# Patient Record
Sex: MALE | Race: WHITE | NOT HISPANIC OR LATINO | URBAN - METROPOLITAN AREA
[De-identification: names, ages, dates, MRNs, and addresses within clinical notes are randomized per-mention and may not be internally consistent; named-entity substitution may affect disease eponyms.]

---

## 2019-09-10 ENCOUNTER — OFFICE (OUTPATIENT)
Dept: URBAN - METROPOLITAN AREA CLINIC 33 | Facility: CLINIC | Age: 49
End: 2019-09-10

## 2019-09-10 VITALS
DIASTOLIC BLOOD PRESSURE: 64 MMHG | HEART RATE: 68 BPM | SYSTOLIC BLOOD PRESSURE: 107 MMHG | TEMPERATURE: 97.4 F | WEIGHT: 204 LBS | HEIGHT: 77 IN

## 2019-09-10 DIAGNOSIS — R10.12 LEFT UPPER QUADRANT PAIN: ICD-10-CM

## 2019-09-10 DIAGNOSIS — K59.00 CONSTIPATION, UNSPECIFIED: ICD-10-CM

## 2019-09-10 DIAGNOSIS — R19.09 OTHER INTRA-ABDOMINAL AND PELVIC SWELLING, MASS AND LUMP: ICD-10-CM

## 2019-09-10 DIAGNOSIS — R93.5 ABNORMAL FINDINGS ON DIAGNOSTIC IMAGING OF OTHER ABDOMINAL R: ICD-10-CM

## 2019-09-10 PROCEDURE — 99204 OFFICE O/P NEW MOD 45 MIN: CPT

## 2020-10-09 ENCOUNTER — OFFICE (OUTPATIENT)
Dept: URBAN - METROPOLITAN AREA CLINIC 34 | Facility: CLINIC | Age: 50
End: 2020-10-09
Payer: COMMERCIAL

## 2020-10-09 VITALS — TEMPERATURE: 98.2 F | HEIGHT: 77 IN | WEIGHT: 147 LBS

## 2020-10-09 DIAGNOSIS — B96.81 HELICOBACTER PYLORI [H. PYLORI] AS THE CAUSE OF DISEASES CLA: ICD-10-CM

## 2020-10-09 DIAGNOSIS — R11.10 VOMITING, UNSPECIFIED: ICD-10-CM

## 2020-10-09 DIAGNOSIS — K59.00 CONSTIPATION, UNSPECIFIED: ICD-10-CM

## 2020-10-09 DIAGNOSIS — C25.9 MALIGNANT NEOPLASM OF PANCREAS, UNSPECIFIED: ICD-10-CM

## 2020-10-09 PROCEDURE — 99215 OFFICE O/P EST HI 40 MIN: CPT

## 2020-10-14 ENCOUNTER — ON CAMPUS - OUTPATIENT (OUTPATIENT)
Dept: URBAN - METROPOLITAN AREA HOSPITAL 65 | Facility: HOSPITAL | Age: 50
End: 2020-10-14
Payer: COMMERCIAL

## 2020-10-14 DIAGNOSIS — R11.0 NAUSEA: ICD-10-CM

## 2020-10-14 DIAGNOSIS — C25.9 MALIGNANT NEOPLASM OF PANCREAS, UNSPECIFIED: ICD-10-CM

## 2020-10-14 PROCEDURE — 43235 EGD DIAGNOSTIC BRUSH WASH: CPT | Performed by: INTERNAL MEDICINE

## 2020-11-09 ENCOUNTER — TELEHEALTH PROVIDED OTHER THAN IN PATIENT'S HOME (OUTPATIENT)
Dept: URBAN - METROPOLITAN AREA TELEHEALTH 7 | Facility: TELEHEALTH | Age: 50
End: 2020-11-09
Payer: COMMERCIAL

## 2020-11-09 VITALS — WEIGHT: 149 LBS | HEIGHT: 77 IN

## 2020-11-09 DIAGNOSIS — C25.9 MALIGNANT NEOPLASM OF PANCREAS, UNSPECIFIED: ICD-10-CM

## 2020-11-09 DIAGNOSIS — K59.00 CONSTIPATION, UNSPECIFIED: ICD-10-CM

## 2020-11-09 DIAGNOSIS — R11.10 VOMITING, UNSPECIFIED: ICD-10-CM

## 2020-11-09 PROCEDURE — 99214 OFFICE O/P EST MOD 30 MIN: CPT | Mod: 95 | Performed by: PHYSICIAN ASSISTANT

## 2020-11-09 NOTE — SERVICEHPINOTES
PATIENT VERIFIED BY DATE OF BIRTH AND NAME. Patient has been consented for this telecommunication visit. Reviewed PmHx, FmHx, SHx. Mr. Hobson is 51 yo male here for f/u to EGD. He was diagnosed with pancreatic adenocarcinoma in 09/2019 (in uncinate process) s/p NILA ablation and gastric bypass (not specifically whipple) in 06/2020: Currently undergoing chemotherapy with Dr Wade Mcgowan. His 10/[address] Zuniga noted normal esophagus and stomach, large amount of brown fluid in stomach (suctioned 1200 ml), duodenum normal, and recommendation that may need referral for feeding tube. He states no longer having episodes of emesis for the past 3 weeks. He has changed his diet and eating smaller meals that has helped. Not taking any Reglan, Creon, or anti-emetic medications at this time. He notes mild bloating that improved with passing gas: No abdominal pain. He recalls prior to chemotherapy he was having BMs every 3-4 days with predominate constipation. He is currently having daily BMs 2-3x/day with intermittent non-bloody, diarrhea since chemo began and the diarrhea resolves within 4 days after chemo sessions. His has been able to gain weight. Prior 09/18/20 normal LFTs, hgb 10.8. Most recent labs with oncologist (requesting records). Denies n/v, abdominal pain, constipation, rectal bleeding, blood in stool, melena, further weight loss.BR